# Patient Record
Sex: FEMALE
[De-identification: names, ages, dates, MRNs, and addresses within clinical notes are randomized per-mention and may not be internally consistent; named-entity substitution may affect disease eponyms.]

---

## 2018-04-07 ENCOUNTER — HOSPITAL ENCOUNTER (EMERGENCY)
Dept: HOSPITAL 14 - H.ER | Age: 53
Discharge: HOME | End: 2018-04-07
Payer: COMMERCIAL

## 2018-04-07 VITALS
OXYGEN SATURATION: 96 % | TEMPERATURE: 98 F | DIASTOLIC BLOOD PRESSURE: 75 MMHG | HEART RATE: 85 BPM | SYSTOLIC BLOOD PRESSURE: 125 MMHG | RESPIRATION RATE: 16 BRPM

## 2018-04-07 DIAGNOSIS — M54.41: Primary | ICD-10-CM

## 2018-04-07 PROCEDURE — 99283 EMERGENCY DEPT VISIT LOW MDM: CPT

## 2018-04-07 PROCEDURE — 96372 THER/PROPH/DIAG INJ SC/IM: CPT

## 2018-04-07 NOTE — ED PDOC
HPI: Back


Time Seen by Provider: 04/07/18 12:12


Chief Complaint (Nursing): Back Pain


Chief Complaint (Provider): Right lower back pain, radiates down leg 


History Per: Patient


History/Exam Limitations: no limitations


Onset/Duration Of Symptoms: Days (2)


Current Symptoms Are (Timing): Still Present


Quality Of Discomfort: Sharp


Severity: Severe


Pain Scale Rating Of: 9


Previous Symptoms: Back Pain (History of sciatica )


Exacerbating Factor(s): Sitting


Additional Complaint(s): 





53 yo female with history of sciatica presents with right lower back pain 

radiating down the right leg for 2 days. PT states she tried to do stretches 

which normally help her but they id not help. Pt states she couln't be seen 

yesterday by PCP because she was unable to walk down her stairs. 





Past Medical History


Reviewed: Historical Data, Nursing Documentation, Vital Signs


Vital Signs: 


 Last Vital Signs











Temp  98 F   04/07/18 12:13


 


Pulse  85   04/07/18 12:13


 


Resp  16   04/07/18 12:13


 


BP  125/75   04/07/18 12:13


 


Pulse Ox  96   04/07/18 12:13














- Medical History


PMH: Gastritis





- Surgical History


Surgical History: No Surg Hx





- Family History


Family History: States: No Known Family Hx





- Living Arrangements


Living Arrangements: With Family





- Social History


Current smoker - smoking cessation education provided: No


Alcohol: None


Drugs: Denies





- Home Medications


Home Medications: 


 Ambulatory Orders











 Medication  Instructions  Recorded


 


Cyclobenzaprine [Cyclobenzaprine 10 mg PO Q8H #20 tab 04/07/18





HCl]  


 


Naproxen [Naprosyn] 500 mg PO BID PRN #20 tablet 04/07/18














- Allergies


Allergies/Adverse Reactions: 


 Allergies











Allergy/AdvReac Type Severity Reaction Status Date / Time


 


No Known Allergies Allergy   Verified 04/07/18 12:12














Review of Systems


ROS Statement: Except As Marked, All Systems Reviewed And Found Negative


Constitutional: Negative for: Fever, Chills


Gastrointestinal: Negative for: Nausea, Vomiting, Abdominal Pain


Musculoskeletal: Positive for: Back Pain





Physical Exam





- Reviewed


Nursing Documentation Reviewed: Yes


Vital Signs Reviewed: Yes





- Physical Exam


Appears: Positive for: Well, Non-toxic, No Acute Distress


Head Exam: Positive for: ATRAUMATIC, NORMAL INSPECTION, NORMOCEPHALIC


Skin: Positive for: Normal Color, Warm, DRY


Eye Exam: Positive for: Normal appearance


ENT: Positive for: Normal ENT Inspection


Neck: Positive for: Normal, Painless ROM


Cardiovascular/Chest: Positive for: Regular Rate, Rhythm


Respiratory: Positive for: CNT, Normal Breath Sounds


Gastrointestinal/Abdominal: Positive for: Normal Exam, Soft


Back: Positive for: Normal Inspection.  Negative for: Vertebral Tenderness


Extremity: Positive for: Normal ROM


Neurologic/Psych: Positive for: Alert, Oriented





- ECG


O2 Sat by Pulse Oximetry: 96





Medical Decision Making


Medical Decision Making: 





Pt reports feeling much better on re-evaluation at 1400. 





Disposition





- Clinical Impression


Clinical Impression: 


 Sciatica








- Patient ED Disposition


Is Patient to be Admitted: No


Counseled Patient/Family Regarding: Diagnosis, Need For Followup, Rx Given





- Disposition


Disposition: Routine/Home


Disposition Time: 14:02


Condition: STABLE


Prescriptions: 


Cyclobenzaprine [Cyclobenzaprine HCl] 10 mg PO Q8H #20 tab


Naproxen [Naprosyn] 500 mg PO BID PRN #20 tablet


 PRN Reason: Pain


Instructions:  Sciatica (DC)